# Patient Record
Sex: FEMALE | Employment: OTHER | URBAN - METROPOLITAN AREA
[De-identification: names, ages, dates, MRNs, and addresses within clinical notes are randomized per-mention and may not be internally consistent; named-entity substitution may affect disease eponyms.]

---

## 2023-03-06 ENCOUNTER — OFFICE VISIT (OUTPATIENT)
Dept: FAMILY MEDICINE | Facility: CLINIC | Age: 83
End: 2023-03-06
Payer: MEDICARE

## 2023-03-06 VITALS
DIASTOLIC BLOOD PRESSURE: 64 MMHG | WEIGHT: 147.2 LBS | HEART RATE: 64 BPM | BODY MASS INDEX: 29.68 KG/M2 | OXYGEN SATURATION: 99 % | HEIGHT: 59 IN | SYSTOLIC BLOOD PRESSURE: 119 MMHG | TEMPERATURE: 98.3 F

## 2023-03-06 DIAGNOSIS — R73.03 PREDIABETES: ICD-10-CM

## 2023-03-06 DIAGNOSIS — E03.9 HYPOTHYROIDISM, UNSPECIFIED TYPE: ICD-10-CM

## 2023-03-06 DIAGNOSIS — G31.84 MILD COGNITIVE IMPAIRMENT: ICD-10-CM

## 2023-03-06 DIAGNOSIS — I35.0 AORTIC VALVE STENOSIS, ETIOLOGY OF CARDIAC VALVE DISEASE UNSPECIFIED: ICD-10-CM

## 2023-03-06 DIAGNOSIS — I10 HYPERTENSION GOAL BP (BLOOD PRESSURE) < 140/90: ICD-10-CM

## 2023-03-06 DIAGNOSIS — N18.4 CHRONIC KIDNEY DISEASE, STAGE IV (SEVERE) (H): ICD-10-CM

## 2023-03-06 DIAGNOSIS — I48.0 PAROXYSMAL ATRIAL FIBRILLATION (H): Primary | ICD-10-CM

## 2023-03-06 DIAGNOSIS — I50.32 CHRONIC HEART FAILURE WITH PRESERVED EJECTION FRACTION (HFPEF) (H): ICD-10-CM

## 2023-03-06 DIAGNOSIS — E78.5 HYPERLIPIDEMIA LDL GOAL <100: ICD-10-CM

## 2023-03-06 PROCEDURE — 99204 OFFICE O/P NEW MOD 45 MIN: CPT | Performed by: FAMILY MEDICINE

## 2023-03-06 ASSESSMENT — PAIN SCALES - GENERAL: PAINLEVEL: NO PAIN (0)

## 2023-03-06 ASSESSMENT — PATIENT HEALTH QUESTIONNAIRE - PHQ9
SUM OF ALL RESPONSES TO PHQ QUESTIONS 1-9: 3
SUM OF ALL RESPONSES TO PHQ QUESTIONS 1-9: 3
10. IF YOU CHECKED OFF ANY PROBLEMS, HOW DIFFICULT HAVE THESE PROBLEMS MADE IT FOR YOU TO DO YOUR WORK, TAKE CARE OF THINGS AT HOME, OR GET ALONG WITH OTHER PEOPLE: NOT DIFFICULT AT ALL

## 2023-03-06 NOTE — PROGRESS NOTES
Assessment & Plan       ICD-10-CM    1. Paroxysmal atrial fibrillation (H)  I48.0       2. Hypothyroidism, unspecified type  E03.9       3. Chronic kidney disease, stage IV (severe) (H)  N18.4       4. Hypertension goal BP (blood pressure) < 140/90  I10       5. Hyperlipidemia LDL goal <100  E78.5       6. Chronic heart failure with preserved ejection fraction (HFpEF) (H)  I50.32       7. Aortic valve stenosis, etiology of cardiac valve disease unspecified  I35.0       8. Prediabetes  R73.03       9. Mild cognitive impairment  G31.84         Her blood pressure and other vital signs look good today, although her blood pressure is somewhat on the low side  Discussed that in an elderly individual on Xarelto with a history of frequent falls, would be cautious to drive the blood pressure too low, so a reasonable systolic goal for her would probably be in the 120s to 130s systolic  Discussed good keeping the blood pressure well controlled would be helpful for her cardiac and renal status, so there is a trade off to the above  She may be able to back off on her blood pressure/cardiac meds a bit to let her blood pressure drift up and we discussed that in some detail  I did review all of her current meds and the indications for them and I reinforced that I think she has been receiving good care and is on appropriate medications for the conditions that she has  I explained the mechanism of action of some of her medications and the dual purpose of some of her meds such as losartan helping to control blood pressure and preserving renal function, etc.  I answered questions they had about her medications and I think it was a productive visit  She will continue ongoing care with her current providers, but possibly look into decreasing or stopping some of her blood pressure medication, such as the hydralazine perhaps     BMI:   Estimated body mass index is 30.24 kg/m  as calculated from the following:    Height as of this  "encounter: 1.486 m (4' 10.5\").    Weight as of this encounter: 66.8 kg (147 lb 3.2 oz).   Weight management plan: Discussed healthy diet and exercise guidelines    45-50 minute visit today including chart review, the visit itself, and subsequent documentation    Return for a follow up as needed/desired.    Tho Mena MD  [facility] MEI Das is a 82 year old accompanied by her daughter in law, presenting for the following health issues:  Consult      History of Present Illness       Reason for visit:  Health asessment    She eats 2-3 servings of fruits and vegetables daily.She consumes 0 sweetened beverage(s) daily.She exercises with enough effort to increase her heart rate 30 to 60 minutes per day.  She exercises with enough effort to increase her heart rate 3 or less days per week.   She is taking medications regularly.    Today's PHQ-9         PHQ-9 Total Score: 3    PHQ-9 Q9 Thoughts of better off dead/self-harm past 2 weeks :   Not at all    How difficult have these problems made it for you to do your work, take care of things at home, or get along with other people: Not difficult at all       Patient is here today to have her health assessed, concerned about all the medication she is on. Has fallen 3 times in the last 6 months, blacked out twice and some new cognitive issues.    The patient is brought in by her son, Rocco, and his wife, Katerin, who are both patients of mine.  They are aware that I have a closed practice, but they are requesting that I review the patient's chart and her medications and give some general input to her care, which I said I would do.  The patient is receiving primary care from an internist through [facility] and also cardiology care through [facility].  I am able to access her past medical history and current medications via Care Everywhere.  I can see that she has a history of the following conditions that she is being treated for: " "paroxysmal atrial fibrillation, hypothyroidism, CKD stage IV, hypertension, hyperlipidemia, CHF with preserved ejection fraction, aortic stenosis, and prediabetes.  She is on a variety of medications which are listed in her chart via Care Everywhere.  These include carvedilol, hydralazine, losartan, bumetanide, isosorbide, levothyroxine, atorvastatin, potassium, and Xarelto.  The patient has had a few falls in recent months as noted above including a fall back in early October where she suffered a closed fracture of the right orbital floor.  She seems to be slowly developing some cognitive impairment issues.    Patient Active Problem List   Diagnosis     Paroxysmal atrial fibrillation (H)     Hypothyroidism, unspecified type     Hypertension goal BP (blood pressure) < 140/90     Chronic kidney disease, stage IV (severe) (H)     Hyperlipidemia LDL goal <100     Chronic heart failure with preserved ejection fraction (HFpEF) (H)     Aortic valve stenosis, etiology of cardiac valve disease unspecified     Prediabetes         No current outpatient medications on file -- see med list via Care Everywhere     No current facility-administered medications for this visit.         Review of Systems   She gets some ankle swelling at times, but usually it is down by the morning.  She generally does not feel lightheaded or dizzy.  She does not want to ever have dialysis.      Objective    /64 (BP Location: Left arm, Patient Position: Sitting, Cuff Size: Adult Regular)   Pulse 64   Temp 98.3  F (36.8  C) (Oral)   Ht 1.486 m (4' 10.5\")   Wt 66.8 kg (147 lb 3.2 oz)   SpO2 99%   BMI 30.24 kg/m    Body mass index is 30.24 kg/m .  Physical Exam   GENERAL: Pleasant, alert, no distress and elderly  EXT: Trace lower extremity edema  NEURO: Grossly normal strength and tone, mentation grossly intact and speech normal.  She did have a little bit of word finding difficulties at times and also would rely on her son and " daughter-in-law to fill in some details of her health history, but generally she would answer questions appropriately and accurately.    I spent a good deal of time reviewing her old records from her internal medicine PCP and her cardiologist via Care Everywhere.

## 2024-08-07 ENCOUNTER — TELEPHONE (OUTPATIENT)
Dept: FAMILY MEDICINE | Facility: CLINIC | Age: 84
End: 2024-08-07
Payer: MEDICARE

## 2024-08-07 NOTE — LETTER
August 7, 2024    To  Pippa Walters  [address]  [address]  [place] [address]    Your team at [facility] cares about your health. We have reviewed your chart and based on our findings; we are making the following recommendations to better manage your health.     You are in particular need of attention regarding the following:     PREVENTATIVE VISIT: Annual Medicare Wellness:Schedule an Annual Medicare Wellness Exam. Please call your [facility] clinic to set up your appointment.    Please call [phone] to schedule.    If you have already completed these items, please contact the clinic via phone or   [facility] so your care team can review and update your records. Thank you for   choosing [facility]ics for your healthcare needs. For any questions,   concerns, or to schedule an appointment please contact our clinic.    Healthy Regards,      Your [facility] Care Team

## 2024-08-07 NOTE — TELEPHONE ENCOUNTER
Patient Quality Outreach    Patient is due for the following:   Physical Annual Wellness Visit    Next Steps:   See below    Type of outreach:    Sent letter.    Next Steps:  Reach out within 90 days via  done .    Max number of attempts reached: Yes. Will try again in 90 days if patient still on fail list.    Questions for provider review:    None           Aurelia Mckeon, [facility]  Chart routed to Closing encounter  .